# Patient Record
Sex: FEMALE | Race: WHITE | NOT HISPANIC OR LATINO | URBAN - METROPOLITAN AREA
[De-identification: names, ages, dates, MRNs, and addresses within clinical notes are randomized per-mention and may not be internally consistent; named-entity substitution may affect disease eponyms.]

---

## 2023-05-12 ENCOUNTER — OFFICE VISIT (OUTPATIENT)
Dept: URBAN - METROPOLITAN AREA CLINIC 107 | Facility: CLINIC | Age: 43
End: 2023-05-12
Payer: MEDICAID

## 2023-05-12 VITALS
RESPIRATION RATE: 18 BRPM | BODY MASS INDEX: 21.66 KG/M2 | HEIGHT: 65 IN | DIASTOLIC BLOOD PRESSURE: 77 MMHG | TEMPERATURE: 96.8 F | SYSTOLIC BLOOD PRESSURE: 101 MMHG | WEIGHT: 130 LBS | HEART RATE: 74 BPM

## 2023-05-12 DIAGNOSIS — K76.9 LIVER LESION: ICD-10-CM

## 2023-05-12 DIAGNOSIS — R10.31 RLQ ABDOMINAL PAIN: ICD-10-CM

## 2023-05-12 DIAGNOSIS — R93.2 ABNORMAL CT OF LIVER: ICD-10-CM

## 2023-05-12 DIAGNOSIS — R19.8 IRREGULAR BOWEL HABITS: ICD-10-CM

## 2023-05-12 DIAGNOSIS — R10.33 PERIUMBILICAL PAIN: ICD-10-CM

## 2023-05-12 DIAGNOSIS — K62.5 RECTAL BLEEDING: ICD-10-CM

## 2023-05-12 PROBLEM — 300331000: Status: ACTIVE | Noted: 2023-05-12

## 2023-05-12 PROCEDURE — 99204 OFFICE O/P NEW MOD 45 MIN: CPT

## 2023-05-12 RX ORDER — DICYCLOMINE HYDROCHLORIDE 10 MG/1
2 CAPSULES CAPSULE ORAL THREE TIMES A DAY
Status: ACTIVE | COMMUNITY

## 2023-05-12 NOTE — HPI-TODAY'S VISIT:
43-year-old female presents for evaluation of abnormal CT scan revealing a liver lesion.  She was seen at [facility] on 4/10/2023 with complaints of right lower quadrant and umbilical pain ongoing for a week associated with nausea, constipation and diarrhea.  Labs revealed mildly low hemoglobin 11.9, normal hematocrit, normal MCV, normal platelet count, normal WBC, normal LFTs, normal lipase, negative pregnancy test CT scan of the abdomen/pelvis without contrast revealed no acute findings.  There were 2 hepatic lesions, including an ill-defined 3.3 cm lesion in the posterior right hepatic lobe and largest measuring 4.4 cm in the left hepatic lobe.  This was exophytic.  Nonemergent MRI of the abdomen with Eovist contrast was recommended.  She was provided Bentyl for relief of abdominal pain, famotidine, and Zofran for upper GI symptoms.  Denies a family history of liver disease. She does have a family history of pancreatic caner with paternal grandfather and his grandmother. Paternal uncle  from colon cancer in his 60s.  She continues to have RLQ pain and periumbilical pain ongoing since the beginning of February. Nothing seems to trigger it. Nothing makes it better. SHe does have intermittent constipation and loose stools. She may go a few days without a bowel movement. If she strains she may have pain and bleeding on the toilet paper. No family history of IBD. She does have a long-standing history of CONCEPCION managed by her PCP. This is fairly controlled as of late.

## 2023-06-13 ENCOUNTER — CLAIMS CREATED FROM THE CLAIM WINDOW (OUTPATIENT)
Dept: URBAN - METROPOLITAN AREA SURGERY CENTER 25 | Facility: SURGERY CENTER | Age: 43
End: 2023-06-13
Payer: MEDICAID

## 2023-06-13 ENCOUNTER — OUT OF OFFICE VISIT (OUTPATIENT)
Dept: URBAN - METROPOLITAN AREA SURGERY CENTER 25 | Facility: SURGERY CENTER | Age: 43
End: 2023-06-13

## 2023-06-13 DIAGNOSIS — Z12.11 COLON CANCER SCREENING (HIGH RISK): ICD-10-CM

## 2023-06-13 DIAGNOSIS — Z12.11 COLON CANCER SCREENING: ICD-10-CM

## 2023-06-13 DIAGNOSIS — K64.1 GRADE II INTERNAL HEMORRHOIDS: ICD-10-CM

## 2023-06-13 DIAGNOSIS — K64.1 HEMORRHOIDS THAT PROLAPSE WITH STRAINING, BUT RETRACT SPONTANEOUSLY: ICD-10-CM

## 2023-06-13 PROCEDURE — 45378 DIAGNOSTIC COLONOSCOPY: CPT | Performed by: INTERNAL MEDICINE

## 2023-06-13 PROCEDURE — G8907 PT DOC NO EVENTS ON DISCHARG: HCPCS | Performed by: INTERNAL MEDICINE

## 2023-06-13 PROCEDURE — 00811 ANES LWR INTST NDSC NOS: CPT | Performed by: ANESTHESIOLOGY

## 2023-06-13 PROCEDURE — 00811 ANES LWR INTST NDSC NOS: CPT | Performed by: ANESTHESIOLOGIST ASSISTANT

## 2023-06-13 RX ORDER — DICYCLOMINE HYDROCHLORIDE 10 MG/1
2 CAPSULES CAPSULE ORAL THREE TIMES A DAY
Status: ACTIVE | COMMUNITY

## 2023-06-19 ENCOUNTER — TELEPHONE ENCOUNTER (OUTPATIENT)
Dept: URBAN - METROPOLITAN AREA CLINIC 113 | Facility: CLINIC | Age: 43
End: 2023-06-19

## 2023-06-19 PROBLEM — 278527001: Status: ACTIVE | Noted: 2023-06-19

## 2023-06-27 LAB
A/G RATIO: 1.4
ABSOLUTE BASOPHILS: 36
ABSOLUTE EOSINOPHILS: 153
ABSOLUTE LYMPHOCYTES: 2106
ABSOLUTE MONOCYTES: 513
ABSOLUTE NEUTROPHILS: 6192
ALBUMIN: 4.2
ALKALINE PHOSPHATASE: 71
ALT (SGPT): 29
AST (SGOT): 23
BASOPHILS: 0.4
BILIRUBIN, TOTAL: 0.5
BUN/CREATININE RATIO: (no result)
BUN: 10
C-REACTIVE PROTEIN, QUANT: 1.4
CALCIUM: 9.3
CARBON DIOXIDE, TOTAL: 20
CHLORIDE: 110
CREATININE: 0.71
EGFR: 107
EOSINOPHILS: 1.7
GLOBULIN, TOTAL: 2.9
GLUCOSE: 75
HEMATOCRIT: 37.8
HEMOGLOBIN: 12
LYMPHOCYTES: 23.4
MCH: 27.2
MCHC: 31.7
MCV: 85.7
MONOCYTES: 5.7
MPV: 11.3
NEUTROPHILS: 68.8
PLATELET COUNT: 302
POTASSIUM: 4.1
PROTEIN, TOTAL: 7.1
RDW: 14.5
RED BLOOD CELL COUNT: 4.41
SED RATE BY MODIFIED: 6
SODIUM: 139
T-TRANSGLUTAMINASE (TTG) IGA: <1
TSH: 0.79
WHITE BLOOD CELL COUNT: 9

## 2023-06-28 ENCOUNTER — OFFICE VISIT (OUTPATIENT)
Dept: URBAN - METROPOLITAN AREA CLINIC 113 | Facility: CLINIC | Age: 43
End: 2023-06-28

## 2023-06-28 RX ORDER — DICYCLOMINE HYDROCHLORIDE 10 MG/1
2 CAPSULES CAPSULE ORAL THREE TIMES A DAY
Status: ACTIVE | COMMUNITY

## 2023-06-28 NOTE — HPI-TODAY'S VISIT:
43-year-old female presents for follow-up.  She was last seen on 2023.  Due to her incidental liver lesions noted on CT, she was recommended an MRI with Eovist contrast to further characterize.  Regarding her intermittent lower abdominal pain with irregular bowel habits and occasional small-volume blood per rectum, she was recommended a colonoscopy to rule out IBD though not likely.  MRI of the abdomen with and without contrast using Eovist contrast 2023: Multiple liver lesions are seen.  Largest lesion in the left hepatic lobe is partially exophytic measuring over 4 cm and this contains Eovist IV contrast on delayed biliary phase imaging and is consistent with focal nodular hyperplasia.  4 other hepatic lesions which do not contain contrast on delayed phase biliary imaging including a 2.3 cm hypointense lesion in the posterior right hepatic lobe in segment VII, a 1.3 cm hypointense lesion in segment VIII and II other subcentimeter hypointense lesions in the liver.  These are other hepatic lesions likely reflecting hepatic adenomas or hemangiomata.  Follow-up MRI recommended to ensure stability.  The MRI was reviewed with Dr. Pruitt.  He did recommend discontinuing any oral contraceptive medication or estrogen containing medication and repeating MRI in 6 months to monitor stability.  Pending repeat imaging could consider referral to [place] for further evaluation.  Patient was informed of recommendations and stated she is not currently on birth control or estrogen containing medicines.  Colonoscopy 2023 performed without difficulty.  BBPS score of 9.  Grade 2 nonbleeding internal hemorrhoids.  TI was normal.  Perianal digital rectal exams were normal.  Repeat colonoscopy in 10 years for screening purposes.  Patient stated she still having abdominal pain, vomiting and thin stools.  She was planned for additional labs.  She was also reassured that there were no evidence of gallstones on MRI and this likely has no effect on her stools.   Patient was seen in ED at [facility] on 6/15 for right sided abdominal cramping. Labs were normal and exam was benign and further imaging was not warranted at that time.   labs 6/15/2023: unremarkable CBC, glucose 114 otherwise normal CMP, normal amylase and lipase.   2023: 43-year-old female presents for evaluation of abnormal CT scan revealing a liver lesion.  She was seen at [facility] on 4/10/2023 with complaints of right lower quadrant and umbilical pain ongoing for a week associated with nausea, constipation and diarrhea.  Labs revealed mildly low hemoglobin 11.9, normal hematocrit, normal MCV, normal platelet count, normal WBC, normal LFTs, normal lipase, negative pregnancy test CT scan of the abdomen/pelvis without contrast revealed no acute findings.  There were 2 hepatic lesions, including an ill-defined 3.3 cm lesion in the posterior right hepatic lobe and largest measuring 4.4 cm in the left hepatic lobe.  This was exophytic.  Nonemergent MRI of the abdomen with Eovist contrast was recommended.  She was provided Bentyl for relief of abdominal pain, famotidine, and Zofran for upper GI symptoms.  Denies a family history of liver disease. She does have a family history of pancreatic caner with paternal grandfather and his grandmother. Paternal uncle  from colon cancer in his 60s.  She continues to have RLQ pain and periumbilical pain ongoing since the beginning of February. Nothing seems to trigger it. Nothing makes it better. SHe does have intermittent constipation and loose stools. She may go a few days without a bowel movement. If she strains she may have pain and bleeding on the toilet paper. No family history of IBD. She does have a long-standing history of CONCEPCION managed by her PCP. This is fairly controlled as of late.

## 2023-06-29 ENCOUNTER — OFFICE VISIT (OUTPATIENT)
Dept: URBAN - METROPOLITAN AREA CLINIC 113 | Facility: CLINIC | Age: 43
End: 2023-06-29
Payer: MEDICAID

## 2023-06-29 VITALS
RESPIRATION RATE: 14 BRPM | WEIGHT: 128 LBS | TEMPERATURE: 98.2 F | SYSTOLIC BLOOD PRESSURE: 120 MMHG | HEIGHT: 65 IN | HEART RATE: 53 BPM | DIASTOLIC BLOOD PRESSURE: 73 MMHG | BODY MASS INDEX: 21.33 KG/M2

## 2023-06-29 DIAGNOSIS — R10.33 PERIUMBILICAL PAIN: ICD-10-CM

## 2023-06-29 DIAGNOSIS — R93.2 ABNORMAL CT OF LIVER: ICD-10-CM

## 2023-06-29 DIAGNOSIS — K76.89 HEPATIC FOCAL NODULAR HYPERPLASIA: ICD-10-CM

## 2023-06-29 DIAGNOSIS — K76.9 LIVER LESION: ICD-10-CM

## 2023-06-29 DIAGNOSIS — R19.8 IRREGULAR BOWEL HABITS: ICD-10-CM

## 2023-06-29 DIAGNOSIS — R10.31 RLQ ABDOMINAL PAIN: ICD-10-CM

## 2023-06-29 DIAGNOSIS — K62.5 RECTAL BLEEDING: ICD-10-CM

## 2023-06-29 PROCEDURE — 99214 OFFICE O/P EST MOD 30 MIN: CPT | Performed by: INTERNAL MEDICINE

## 2023-06-29 RX ORDER — DICYCLOMINE HYDROCHLORIDE 10 MG/1
2 CAPSULES CAPSULE ORAL THREE TIMES A DAY
Status: ON HOLD | COMMUNITY

## 2023-06-29 RX ORDER — HYOSCYAMINE SULFATE 0.12 MG/1
1 TABLET AS NEEDED TABLET ORAL
Qty: 180 TABLET | Refills: 0 | OUTPATIENT
Start: 2023-06-29

## 2023-06-29 NOTE — HPI-TODAY'S VISIT:
43-year-old female presents for follow-up.  She was last seen on 2023.  Due to her incidental liver lesions noted on CT, she was recommended an MRI with Eovist contrast to further characterize.  Regarding her intermittent lower abdominal pain with irregular bowel habits and occasional small-volume blood per rectum, she was recommended a colonoscopy to rule out IBD though not likely.  She was seen at the [facility] for abdominal pain.   MRI of the abdomen with and without contrast using Eovist contrast 2023: Multiple liver lesions are seen.  Largest lesion in the left hepatic lobe is partially exophytic measuring over 4 cm and this contains Eovist IV contrast on delayed biliary phase imaging and is consistent with focal nodular hyperplasia.  4 other hepatic lesions which do not contain contrast on delayed phase biliary imaging including a 2.3 cm hypointense lesion in the posterior right hepatic lobe in segment VII, a 1.3 cm hypointense lesion in segment VIII and II other subcentimeter hypointense lesions in the liver.  These are other hepatic lesions likely reflecting hepatic adenomas or hemangiomata.  Follow-up MRI recommended to ensure stability.  The MRI was reviewed with Dr. Pruitt.  He did recommend discontinuing any oral contraceptive medication or estrogen containing medication and repeating MRI in 6 months to monitor stability.  Pending repeat imaging could consider referral to [place] for further evaluation.  Patient was informed of recommendations and stated she is not currently on birth control or estrogen containing medicines.  Colonoscopy 2023 performed without difficulty.  BBPS score of 9.  Grade 2 nonbleeding internal hemorrhoids.  TI was normal.  Perianal digital rectal exams were normal.  Repeat colonoscopy in 10 years for screening purposes.  Patient stated she still having abdominal pain, vomiting and thin stools.  She was planned for additional labs.  She was also reassured that there were no evidence of gallstones on MRI and this likely has no effect on her stools.   Patient was seen in ED at [facility] on 6/15 for right sided abdominal cramping. Labs were normal and exam was benign and further imaging was not warranted at that time.   labs 6/15/2023: unremarkable CBC, glucose 114 otherwise normal CMP, normal amylase and lipase.   2023: 43-year-old female presents for evaluation of abnormal CT scan revealing a liver lesion.  She was seen at [facility] on 4/10/2023 with complaints of right lower quadrant and umbilical pain ongoing for a week associated with nausea, constipation and diarrhea.  Labs revealed mildly low hemoglobin 11.9, normal hematocrit, normal MCV, normal platelet count, normal WBC, normal LFTs, normal lipase, negative pregnancy test CT scan of the abdomen/pelvis without contrast revealed no acute findings.  There were 2 hepatic lesions, including an ill-defined 3.3 cm lesion in the posterior right hepatic lobe and largest measuring 4.4 cm in the left hepatic lobe.  This was exophytic.  Nonemergent MRI of the abdomen with Eovist contrast was recommended.  She was provided Bentyl for relief of abdominal pain, famotidine, and Zofran for upper GI symptoms.  Denies a family history of liver disease. She does have a family history of pancreatic caner with paternal grandfather and his grandmother. Paternal uncle  from colon cancer in his 60s.  She continues to have RLQ pain and periumbilical pain ongoing since the beginning of February. Nothing seems to trigger it. Nothing makes it better. SHe does have intermittent constipation and loose stools. She may go a few days without a bowel movement. If she strains she may have pain and bleeding on the toilet paper. No family history of IBD. She does have a long-standing history of CONCEPCION managed by her PCP. This is fairly controlled as of late.

## 2023-08-07 ENCOUNTER — OFFICE VISIT (OUTPATIENT)
Dept: URBAN - METROPOLITAN AREA SURGERY CENTER 25 | Facility: SURGERY CENTER | Age: 43
End: 2023-08-07
Payer: MEDICAID

## 2023-08-07 ENCOUNTER — WEB ENCOUNTER (OUTPATIENT)
Dept: URBAN - METROPOLITAN AREA SURGERY CENTER 25 | Facility: SURGERY CENTER | Age: 43
End: 2023-08-07

## 2023-08-07 ENCOUNTER — CLAIMS CREATED FROM THE CLAIM WINDOW (OUTPATIENT)
Dept: URBAN - METROPOLITAN AREA CLINIC 4 | Facility: CLINIC | Age: 43
End: 2023-08-07
Payer: MEDICAID

## 2023-08-07 DIAGNOSIS — K29.70 GASTRITIS, UNSPECIFIED, WITHOUT BLEEDING: ICD-10-CM

## 2023-08-07 DIAGNOSIS — K31.89 OTHER DISEASES OF STOMACH AND DUODENUM: ICD-10-CM

## 2023-08-07 DIAGNOSIS — R10.13 EPIGASTRIC PAIN: ICD-10-CM

## 2023-08-07 DIAGNOSIS — K29.70 GASTRITIS: ICD-10-CM

## 2023-08-07 DIAGNOSIS — R10.13 ABDOMINAL DISCOMFORT, EPIGASTRIC: ICD-10-CM

## 2023-08-07 PROCEDURE — 88305 TISSUE EXAM BY PATHOLOGIST: CPT | Performed by: PATHOLOGY

## 2023-08-07 PROCEDURE — 43239 EGD BIOPSY SINGLE/MULTIPLE: CPT | Performed by: INTERNAL MEDICINE

## 2023-08-07 PROCEDURE — 88312 SPECIAL STAINS GROUP 1: CPT | Performed by: PATHOLOGY

## 2023-08-07 PROCEDURE — G8907 PT DOC NO EVENTS ON DISCHARG: HCPCS | Performed by: INTERNAL MEDICINE

## 2023-08-07 PROCEDURE — 00731 ANES UPR GI NDSC PX NOS: CPT | Performed by: ANESTHESIOLOGY

## 2023-08-07 PROCEDURE — 00731 ANES UPR GI NDSC PX NOS: CPT | Performed by: ANESTHESIOLOGIST ASSISTANT

## 2023-08-07 RX ORDER — DICYCLOMINE HYDROCHLORIDE 10 MG/1
2 CAPSULES CAPSULE ORAL THREE TIMES A DAY
Status: ON HOLD | COMMUNITY

## 2023-08-07 RX ORDER — HYOSCYAMINE SULFATE 0.12 MG/1
1 TABLET AS NEEDED TABLET ORAL
Qty: 180 TABLET | Refills: 0 | Status: ACTIVE | COMMUNITY
Start: 2023-06-29

## 2023-08-21 ENCOUNTER — TELEPHONE ENCOUNTER (OUTPATIENT)
Dept: URBAN - METROPOLITAN AREA CLINIC 113 | Facility: CLINIC | Age: 43
End: 2023-08-21

## 2023-10-30 ENCOUNTER — OFFICE VISIT (OUTPATIENT)
Dept: URBAN - METROPOLITAN AREA CLINIC 113 | Facility: CLINIC | Age: 43
End: 2023-10-30

## 2023-12-01 ENCOUNTER — LAB OUTSIDE AN ENCOUNTER (OUTPATIENT)
Dept: URBAN - METROPOLITAN AREA CLINIC 113 | Facility: CLINIC | Age: 43
End: 2023-12-01

## 2024-01-02 ENCOUNTER — TELEPHONE ENCOUNTER (OUTPATIENT)
Dept: URBAN - METROPOLITAN AREA CLINIC 113 | Facility: CLINIC | Age: 44
End: 2024-01-02

## 2024-01-02 ENCOUNTER — OFFICE VISIT (OUTPATIENT)
Dept: URBAN - METROPOLITAN AREA CLINIC 113 | Facility: CLINIC | Age: 44
End: 2024-01-02
Payer: MEDICAID

## 2024-01-02 ENCOUNTER — DASHBOARD ENCOUNTERS (OUTPATIENT)
Age: 44
End: 2024-01-02

## 2024-01-02 VITALS
DIASTOLIC BLOOD PRESSURE: 81 MMHG | RESPIRATION RATE: 18 BRPM | BODY MASS INDEX: 21.99 KG/M2 | HEART RATE: 78 BPM | HEIGHT: 65 IN | SYSTOLIC BLOOD PRESSURE: 128 MMHG | TEMPERATURE: 97.5 F | WEIGHT: 132 LBS

## 2024-01-02 DIAGNOSIS — R10.33 PERIUMBILICAL PAIN: ICD-10-CM

## 2024-01-02 DIAGNOSIS — R19.8 IRREGULAR BOWEL HABITS: ICD-10-CM

## 2024-01-02 DIAGNOSIS — K62.5 RECTAL BLEEDING: ICD-10-CM

## 2024-01-02 DIAGNOSIS — R93.2 ABNORMAL CT OF LIVER: ICD-10-CM

## 2024-01-02 DIAGNOSIS — K76.89 HEPATIC FOCAL NODULAR HYPERPLASIA: ICD-10-CM

## 2024-01-02 DIAGNOSIS — R10.31 RLQ ABDOMINAL PAIN: ICD-10-CM

## 2024-01-02 DIAGNOSIS — R10.11 RUQ PAIN: ICD-10-CM

## 2024-01-02 DIAGNOSIS — K76.9 LIVER LESION: ICD-10-CM

## 2024-01-02 PROCEDURE — 99214 OFFICE O/P EST MOD 30 MIN: CPT

## 2024-01-02 RX ORDER — HYOSCYAMINE SULFATE 0.12 MG/1
1 TABLET AS NEEDED TABLET ORAL
Qty: 180 TABLET | Refills: 0 | Status: ON HOLD | COMMUNITY
Start: 2023-06-29

## 2024-01-02 RX ORDER — AMITRIPTYLINE HYDROCHLORIDE 10 MG/1
1 TABLET AT BEDTIME TABLET, FILM COATED ORAL ONCE A DAY
Qty: 90 | Refills: 2 | OUTPATIENT
Start: 2024-01-02

## 2024-01-02 RX ORDER — DICYCLOMINE HYDROCHLORIDE 10 MG/1
2 CAPSULES CAPSULE ORAL THREE TIMES A DAY
Status: ON HOLD | COMMUNITY

## 2024-01-02 NOTE — HPI-TODAY'S VISIT:
43-year-old female presents for follow-up.  She was last seen on 2023.  She was planned for EGD and referred to Dr. Euceda for evaluation of FNH versus hepatic adenoma.  EGD 2023:Performed without difficulty.  Normal esophagus and duodenum.  Gastritis noted and biopsied.  Pathology revealed chemical/reactive gastropathy.  She was seen by Dr. Chris Landers at [place] on 2023.  Her imaging was to be obtained and reviewed with the multidisciplinary hepatobiliary imaging conference for further treatment/follow-up.  She has not heard back from [place]. SHe was never contacted regarding her MRI. She states her RUQ pain has continued to worsen. She cannot sleep well at night becuase of the pain. She states her bowels alterante between loose and hard and dark. She does have normal borwn stools as well.   2023:  43-year-old female presents for follow-up.  She was last seen on 2023.  Due to her incidental liver lesions noted on CT, she was recommended an MRI with Eovist contrast to further characterize.  Regarding her intermittent lower abdominal pain with irregular bowel habits and occasional small-volume blood per rectum, she was recommended a colonoscopy to rule out IBD though not likely.  She was seen at the [facility] for abdominal pain.   MRI of the abdomen with and without contrast using Eovist contrast 2023: Multiple liver lesions are seen.  Largest lesion in the left hepatic lobe is partially exophytic measuring over 4 cm and this contains Eovist IV contrast on delayed biliary phase imaging and is consistent with focal nodular hyperplasia.  4 other hepatic lesions which do not contain contrast on delayed phase biliary imaging including a 2.3 cm hypointense lesion in the posterior right hepatic lobe in segment VII, a 1.3 cm hypointense lesion in segment VIII and II other subcentimeter hypointense lesions in the liver.  These are other hepatic lesions likely reflecting hepatic adenomas or hemangiomata.  Follow-up MRI recommended to ensure stability.  The MRI was reviewed with Dr. Pruitt.  He did recommend discontinuing any oral contraceptive medication or estrogen containing medication and repeating MRI in 6 months to monitor stability.  Pending repeat imaging could consider referral to [place] for further evaluation.  Patient was informed of recommendations and stated she is not currently on birth control or estrogen containing medicines.  Colonoscopy 2023 performed without difficulty.  BBPS score of 9.  Grade 2 nonbleeding internal hemorrhoids.  TI was normal.  Perianal digital rectal exams were normal.  Repeat colonoscopy in 10 years for screening purposes.  Patient stated she still having abdominal pain, vomiting and thin stools.  She was planned for additional labs.  She was also reassured that there were no evidence of gallstones on MRI and this likely has no effect on her stools.   Patient was seen in ED at [facility] on 6/15 for right sided abdominal cramping. Labs were normal and exam was benign and further imaging was not warranted at that time.   labs 6/15/2023: unremarkable CBC, glucose 114 otherwise normal CMP, normal amylase and lipase.   2023: 43-year-old female presents for evaluation of abnormal CT scan revealing a liver lesion.  She was seen at [facility] on 4/10/2023 with complaints of right lower quadrant and umbilical pain ongoing for a week associated with nausea, constipation and diarrhea.  Labs revealed mildly low hemoglobin 11.9, normal hematocrit, normal MCV, normal platelet count, normal WBC, normal LFTs, normal lipase, negative pregnancy test CT scan of the abdomen/pelvis without contrast revealed no acute findings.  There were 2 hepatic lesions, including an ill-defined 3.3 cm lesion in the posterior right hepatic lobe and largest measuring 4.4 cm in the left hepatic lobe.  This was exophytic.  Nonemergent MRI of the abdomen with Eovist contrast was recommended.  She was provided Bentyl for relief of abdominal pain, famotidine, and Zofran for upper GI symptoms.  Denies a family history of liver disease. She does have a family history of pancreatic caner with paternal grandfather and his grandmother. Paternal uncle  from colon cancer in his 60s.  She continues to have RLQ pain and periumbilical pain ongoing since the beginning of February. Nothing seems to trigger it. Nothing makes it better. SHe does have intermittent constipation and loose stools. She may go a few days without a bowel movement. If she strains she may have pain and bleeding on the toilet paper. No family history of IBD. She does have a long-standing history of CONCEPCION managed by her PCP. This is fairly controlled as of late.

## 2024-03-25 ENCOUNTER — OV EP (OUTPATIENT)
Dept: URBAN - METROPOLITAN AREA CLINIC 113 | Facility: CLINIC | Age: 44
End: 2024-03-25

## 2024-05-24 ENCOUNTER — OFFICE VISIT (OUTPATIENT)
Dept: URBAN - METROPOLITAN AREA CLINIC 113 | Facility: CLINIC | Age: 44
End: 2024-05-24